# Patient Record
Sex: FEMALE | Race: WHITE | URBAN - METROPOLITAN AREA
[De-identification: names, ages, dates, MRNs, and addresses within clinical notes are randomized per-mention and may not be internally consistent; named-entity substitution may affect disease eponyms.]

---

## 2019-06-16 ENCOUNTER — EMERGENCY (EMERGENCY)
Facility: HOSPITAL | Age: 49
LOS: 1 days | Discharge: HOME | End: 2019-06-16
Attending: EMERGENCY MEDICINE | Admitting: EMERGENCY MEDICINE
Payer: COMMERCIAL

## 2019-06-16 VITALS
DIASTOLIC BLOOD PRESSURE: 81 MMHG | RESPIRATION RATE: 19 BRPM | TEMPERATURE: 98 F | SYSTOLIC BLOOD PRESSURE: 149 MMHG | HEART RATE: 90 BPM | WEIGHT: 181.44 LBS | OXYGEN SATURATION: 98 %

## 2019-06-16 VITALS
DIASTOLIC BLOOD PRESSURE: 78 MMHG | TEMPERATURE: 98 F | RESPIRATION RATE: 18 BRPM | HEART RATE: 90 BPM | SYSTOLIC BLOOD PRESSURE: 146 MMHG | OXYGEN SATURATION: 98 %

## 2019-06-16 PROCEDURE — 96374 THER/PROPH/DIAG INJ IV PUSH: CPT

## 2019-06-16 PROCEDURE — 94640 AIRWAY INHALATION TREATMENT: CPT

## 2019-06-16 PROCEDURE — 99284 EMERGENCY DEPT VISIT MOD MDM: CPT

## 2019-06-16 PROCEDURE — 99284 EMERGENCY DEPT VISIT MOD MDM: CPT | Mod: 25

## 2019-06-16 RX ORDER — MAGNESIUM SULFATE 500 MG/ML
2 VIAL (ML) INJECTION ONCE
Refills: 0 | Status: COMPLETED | OUTPATIENT
Start: 2019-06-16 | End: 2019-06-16

## 2019-06-16 RX ORDER — IPRATROPIUM/ALBUTEROL SULFATE 18-103MCG
3 AEROSOL WITH ADAPTER (GRAM) INHALATION
Refills: 0 | Status: COMPLETED | OUTPATIENT
Start: 2019-06-16 | End: 2019-06-16

## 2019-06-16 RX ORDER — ALBUTEROL 90 UG/1
3 AEROSOL, METERED ORAL
Qty: 28 | Refills: 0
Start: 2019-06-16 | End: 2019-07-15

## 2019-06-16 RX ADMIN — Medication 50 GRAM(S): at 13:41

## 2019-06-16 RX ADMIN — Medication 3 MILLILITER(S): at 14:14

## 2019-06-16 RX ADMIN — Medication 3 MILLILITER(S): at 13:41

## 2019-06-16 NOTE — ED ADULT NURSE NOTE - OBJECTIVE STATEMENT
pt states she was seen at [facility] MD yesterday, given nebulizer tx and also steriods but does not feel improvement.

## 2019-06-16 NOTE — ED ADULT TRIAGE NOTE - CHIEF COMPLAINT QUOTE
asthma flare up with hacking cough- [facility] today and sent here for further evaluation and treatment - wheezing throughout-- -350-- duoneb given in triage

## 2019-06-16 NOTE — ED PROVIDER NOTE - PHYSICAL EXAMINATION
CONSTITUTIONAL: Well-appearing; well-nourished; in no apparent distress.   HEAD: Normocephalic; atraumatic.   EYES: PERRL; EOM intact; conjunctiva and sclera clear  ENT: normal nose; no rhinorrhea; normal pharynx with no erythema or lesions.   NECK: Supple; non-tender; no LAD  CARDIOVASCULAR: Normal S1, S2; no murmurs, rubs, or gallops. Regular rate and rhythm.   RESPIRATORY: speaking in full sentences, +coughing, +wheezing throughout   GI: Soft; non-distended; non-tender; no palpable organomegaly.   MSK: FROM at all extremities, normal tone   EXT: No cyanosis or edema; N/V intact  SKIN: Normal for age and race; warm; dry; good turgor; no apparent lesions or rash.   NEURO: A & O x 3; face symmetric; grossly unremarkable.   PSYCHOLOGICAL: The patient’s mood and manner are appropriate.

## 2019-06-16 NOTE — ED ADULT NURSE NOTE - CHPI ED NUR SYMPTOMS NEG
no headache/no hemoptysis/no body aches/no shortness of breath/no edema/no fever/no chills/no diaphoresis/no chest pain

## 2019-06-16 NOTE — ED PROVIDER NOTE - ATTENDING CONTRIBUTION TO CARE
48F pmh asthma, no intubations, recently started on steroids for exacerbations. No cp, no fever/chills, no worsening sob. Sat 97%, speaking full sentences. Feeling improved after breathing tx, seeking discharge. Unlikely ptx/pna/chf/acs.

## 2019-06-16 NOTE — ED PROVIDER NOTE - CLINICAL SUMMARY MEDICAL DECISION MAKING FREE TEXT BOX
47 yo F with pmh of asthma (occasional attacks, never been intubated) c/o cough, wheezing and sob x 5 days. Symptoms started after she was doing a lot of cleaning in her house and [place] camp[place]. On second day of prednisone. CXR clear yesterday. O2 sat 98%. speaking in full sentences, +coughing, +wheezing throughout 47 yo F with pmh of asthma (occasional attacks, never been intubated) c/o cough, wheezing and sob x 5 days. Symptoms started after she was doing a lot of cleaning in her house and at her campgrounds. On second day of prednisone. CXR clear yesterday. O2 sat 98%. speaking in full sentences, +coughing, +wheezing throughout. Pt feeling better after nebs and lung exam improved. Neb machine prescribed. Pt already has Rx for steroids.

## 2019-06-16 NOTE — ED PROVIDER NOTE - OBJECTIVE STATEMENT
47 yo F with pmh of asthma (occasional attacks, never been intubated) c/o cough, wheezing and sob x 5 days. Symptoms started after she was doing a lot of cleaning in her house and at her campgrounds. 4 days ago symptoms worsened and she was using her rescue inhaler frequently. Cough productive with clear sputum. Denies fever, chills, cp, recent travel, LE swelling. Pt see at [facility] yesterday, cxr was clear, pt was given nebulizer and steroids. Pt took another 40mg of prednisone this morning. Pt went back to [facility] and they referred her to the ED.

## 2019-06-20 DIAGNOSIS — J45.901 UNSPECIFIED ASTHMA WITH (ACUTE) EXACERBATION: ICD-10-CM

## 2024-12-18 ENCOUNTER — OFFICE (OUTPATIENT)
Dept: URBAN - METROPOLITAN AREA CLINIC 28 | Facility: CLINIC | Age: 54
Setting detail: OPHTHALMOLOGY
End: 2024-12-18
Payer: COMMERCIAL

## 2024-12-18 DIAGNOSIS — H53.10: ICD-10-CM

## 2024-12-18 DIAGNOSIS — H40.013: ICD-10-CM

## 2024-12-18 PROCEDURE — 92004 COMPRE OPH EXAM NEW PT 1/>: CPT | Performed by: OPHTHALMOLOGY

## 2024-12-18 PROCEDURE — 92133 CPTRZD OPH DX IMG PST SGM ON: CPT | Performed by: OPHTHALMOLOGY

## 2024-12-18 ASSESSMENT — CONFRONTATIONAL VISUAL FIELD TEST (CVF)
OD_FINDINGS: FULL
OS_FINDINGS: FULL

## 2024-12-18 ASSESSMENT — REFRACTION_AUTOREFRACTION
OS_CYLINDER: -0.75
OD_AXIS: 082
OD_CYLINDER: -0.75
OD_SPHERE: -3.00
OS_AXIS: 086
OS_SPHERE: -3.75

## 2024-12-18 ASSESSMENT — KERATOMETRY
OS_K2POWER_DIOPTERS: 44.25
OD_K2POWER_DIOPTERS: 43.75
OS_AXISANGLE_DEGREES: 043
OD_AXISANGLE_DEGREES: 124
OD_K1POWER_DIOPTERS: 43.25
OS_K1POWER_DIOPTERS: 43.50

## 2024-12-18 ASSESSMENT — TONOMETRY
OS_IOP_MMHG: 19
OD_IOP_MMHG: 19

## 2024-12-18 ASSESSMENT — REFRACTION_CURRENTRX
OD_AXIS: 097
OS_ADD: +2.50
OD_VPRISM_DIRECTION: PROGS
OS_AXIS: 102
OD_CYLINDER: -0.25
OD_SPHERE: -4.00
OS_CYLINDER: -0.75
OD_ADD: +2.50
OS_VPRISM_DIRECTION: PROGS
OS_SPHERE: -4.25
OD_OVR_VA: 20/
OS_OVR_VA: 20/

## 2024-12-18 ASSESSMENT — VISUAL ACUITY
OD_BCVA: 20/25+
OS_BCVA: 20/20-